# Patient Record
Sex: MALE | Race: WHITE | URBAN - METROPOLITAN AREA
[De-identification: names, ages, dates, MRNs, and addresses within clinical notes are randomized per-mention and may not be internally consistent; named-entity substitution may affect disease eponyms.]

---

## 2024-08-29 PROCEDURE — 88305 TISSUE EXAM BY PATHOLOGIST: CPT | Performed by: PATHOLOGY

## 2024-08-29 PROCEDURE — 88305 TISSUE EXAM BY PATHOLOGIST: CPT

## 2024-08-30 ENCOUNTER — LAB REQUISITION (OUTPATIENT)
Dept: LAB | Facility: HOSPITAL | Age: 74
End: 2024-08-30
Payer: MEDICARE

## 2024-08-30 DIAGNOSIS — D50.0 IRON DEFICIENCY ANEMIA SECONDARY TO BLOOD LOSS (CHRONIC): ICD-10-CM

## 2024-09-09 LAB
LABORATORY COMMENT REPORT: NORMAL
PATH REPORT.FINAL DX SPEC: NORMAL
PATH REPORT.GROSS SPEC: NORMAL
PATH REPORT.RELEVANT HX SPEC: NORMAL
PATH REPORT.TOTAL CANCER: NORMAL

## 2024-10-03 ENCOUNTER — DOCUMENTATION (OUTPATIENT)
Dept: GASTROENTEROLOGY | Facility: HOSPITAL | Age: 74
End: 2024-10-03
Payer: MEDICARE

## 2024-10-03 NOTE — PROGRESS NOTES
Dr. Eloy Mares's office received a referral from ANNY Hart for this patient to undergo a capsule endoscopy for SALOMÓN due to chronic blood loss.. Dr. Srikanth Tidwell reviewed the referral on 10/02/2024. Per Dr. Tidwell, [place] to schedule.    [facility] centralized schedulers were notified to call and schedule this patient. Contact Kamilah Michelle RN at [phone] for any questions.      See below for supporting clinical information from the referral sent by ANNY Hart's office:    SALOMÓN due to chronic blood loss     Progress Notes from ALMA Hart CNP:  ·       Reason for appointment: blood in stool and anemia  ·       Labs from 8/6/2024: HGB 8.8, HCT 27.3, MCHC 32.3  ·       Last colonoscopy 2014 revealed diverticulosis in the sigmoid and descending colon.  ·       Has never had EGD  ·       A fecal occult blood test was positive  ·       His first iron transfusion was on 8/9/2024.  ·       C/o SOB, severe fatigue     Colonoscopy 8/29/2024 performed by Dr. Zac Hines:  ·       Indication: SALOMÓN  ·       3 mm ascending colon polyp removed  ·       Mild non-bleeding diverticulosis was noted in the sigmoid colon  ·       Retroflexion views revealed a grade 1 internal hemorrhoid     EGD 8/29/2024 performed by Dr. Zac Hines:  ·       The duodenal mucosa showed no abnormalities in the entire duodenum.  ·       The mucosa of the stomach and esophagus appeared normal.  ·       The GE junction was noted at 40 cm from the incisors

## 2024-10-17 RX ORDER — METFORMIN HYDROCHLORIDE 1000 MG/1
1000 TABLET ORAL
COMMUNITY
Start: 2024-06-11

## 2024-10-17 RX ORDER — CLOPIDOGREL BISULFATE 75 MG/1
75 TABLET ORAL
COMMUNITY
Start: 2024-05-03

## 2024-10-17 RX ORDER — LOSARTAN POTASSIUM 25 MG/1
1 TABLET ORAL DAILY
COMMUNITY
Start: 2024-06-11

## 2024-10-17 RX ORDER — EZETIMIBE 10 MG/1
1 TABLET ORAL DAILY
COMMUNITY
Start: 2024-04-15

## 2024-10-17 RX ORDER — MIRTAZAPINE 15 MG/1
1 TABLET, FILM COATED ORAL NIGHTLY
COMMUNITY
Start: 2024-08-06

## 2024-10-17 RX ORDER — CARVEDILOL 12.5 MG/1
12.5 TABLET ORAL
COMMUNITY
Start: 2024-06-11

## 2024-10-17 RX ORDER — ACETAMINOPHEN 500 MG
1000 TABLET ORAL EVERY OTHER DAY
COMMUNITY

## 2024-10-24 ENCOUNTER — APPOINTMENT (OUTPATIENT)
Dept: GASTROENTEROLOGY | Facility: HOSPITAL | Age: 74
End: 2024-10-24
Payer: MEDICARE

## 2024-10-28 ENCOUNTER — HOSPITAL ENCOUNTER (OUTPATIENT)
Dept: GASTROENTEROLOGY | Facility: HOSPITAL | Age: 74
Discharge: HOME | End: 2024-10-28
Payer: MEDICARE

## 2024-10-28 DIAGNOSIS — D50.0 IRON DEFICIENCY ANEMIA DUE TO CHRONIC BLOOD LOSS: ICD-10-CM

## 2024-10-28 PROCEDURE — 91110 GI TRC IMG INTRAL ESOPH-ILE: CPT | Performed by: INTERNAL MEDICINE

## 2024-10-28 PROCEDURE — 2720000007 HC OR 272 NO HCPCS

## 2024-10-29 ASSESSMENT — PAIN SCALES - GENERAL: PAINLEVEL_OUTOF10: 0 - NO PAIN

## 2024-10-31 PROCEDURE — 91110 GI TRC IMG INTRAL ESOPH-ILE: CPT | Performed by: INTERNAL MEDICINE
